# Patient Record
Sex: MALE | Race: BLACK OR AFRICAN AMERICAN | Employment: OTHER | URBAN - METROPOLITAN AREA
[De-identification: names, ages, dates, MRNs, and addresses within clinical notes are randomized per-mention and may not be internally consistent; named-entity substitution may affect disease eponyms.]

---

## 2020-07-07 ENCOUNTER — APPOINTMENT (OUTPATIENT)
Dept: GENERAL RADIOLOGY | Age: 45
End: 2020-07-07
Payer: COMMERCIAL

## 2020-07-07 ENCOUNTER — HOSPITAL ENCOUNTER (EMERGENCY)
Age: 45
Discharge: HOME | End: 2020-07-07
Payer: COMMERCIAL

## 2020-07-07 VITALS
SYSTOLIC BLOOD PRESSURE: 132 MMHG | RESPIRATION RATE: 16 BRPM | DIASTOLIC BLOOD PRESSURE: 88 MMHG | TEMPERATURE: 98.1 F | WEIGHT: 210 LBS | HEIGHT: 69 IN | OXYGEN SATURATION: 97 % | HEART RATE: 74 BPM | BODY MASS INDEX: 31.1 KG/M2

## 2020-07-07 PROCEDURE — 73090 X-RAY EXAM OF FOREARM: CPT

## 2020-07-07 PROCEDURE — 73130 X-RAY EXAM OF HAND: CPT

## 2020-07-07 PROCEDURE — 6370000000 HC RX 637 (ALT 250 FOR IP): Performed by: PHYSICIAN ASSISTANT

## 2020-07-07 PROCEDURE — 73630 X-RAY EXAM OF FOOT: CPT

## 2020-07-07 PROCEDURE — 99283 EMERGENCY DEPT VISIT LOW MDM: CPT

## 2020-07-07 PROCEDURE — 73610 X-RAY EXAM OF ANKLE: CPT

## 2020-07-07 RX ORDER — IBUPROFEN 600 MG/1
600 TABLET ORAL ONCE
Status: COMPLETED | OUTPATIENT
Start: 2020-07-07 | End: 2020-07-07

## 2020-07-07 RX ORDER — TRAMADOL HYDROCHLORIDE 50 MG/1
50 TABLET ORAL ONCE
Status: COMPLETED | OUTPATIENT
Start: 2020-07-07 | End: 2020-07-07

## 2020-07-07 RX ORDER — TRAMADOL HYDROCHLORIDE 50 MG/1
50 TABLET ORAL EVERY 6 HOURS PRN
Qty: 10 TABLET | Refills: 0 | Status: SHIPPED | OUTPATIENT
Start: 2020-07-07 | End: 2020-07-10

## 2020-07-07 RX ADMIN — IBUPROFEN 600 MG: 600 TABLET, FILM COATED ORAL at 20:43

## 2020-07-07 RX ADMIN — TRAMADOL HYDROCHLORIDE 50 MG: 50 TABLET, FILM COATED ORAL at 20:43

## 2020-07-07 ASSESSMENT — PAIN SCALES - GENERAL
PAINLEVEL_OUTOF10: 9
PAINLEVEL_OUTOF10: 9

## 2020-07-07 ASSESSMENT — PAIN DESCRIPTION - PAIN TYPE: TYPE: ACUTE PAIN
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
## 2020-07-08 NOTE — ED NOTES
Ace wrap applied to the right foot and ankle. Patient encouraged to apply ice at home. And RICE.  Patient left in stable condition     Chelsea Pollack RN  07/07/20 2042

## 2020-07-13 ENCOUNTER — HOSPITAL ENCOUNTER (INPATIENT)
Age: 45
LOS: 1 days | Discharge: LEFT BEFORE DISCHARGE | DRG: 469 | End: 2020-07-14
Attending: INTERNAL MEDICINE | Admitting: INTERNAL MEDICINE
Payer: COMMERCIAL

## 2020-07-13 ENCOUNTER — APPOINTMENT (OUTPATIENT)
Dept: CT IMAGING | Age: 45
DRG: 469 | End: 2020-07-13
Payer: COMMERCIAL

## 2020-07-13 PROBLEM — N17.9 ACUTE RENAL FAILURE (ARF) (HCC): Status: ACTIVE | Noted: 2020-07-13

## 2020-07-13 LAB
ALBUMIN SERPL-MCNC: 6.8 GM/DL (ref 3.4–5)
ALP BLD-CCNC: 77 IU/L (ref 40–128)
ALT SERPL-CCNC: 27 U/L (ref 10–40)
ANION GAP SERPL CALCULATED.3IONS-SCNC: 19 MMOL/L (ref 4–16)
AST SERPL-CCNC: 24 IU/L (ref 15–37)
BASOPHILS ABSOLUTE: 0.1 K/CU MM
BASOPHILS RELATIVE PERCENT: 0.4 % (ref 0–1)
BILIRUB SERPL-MCNC: 0.7 MG/DL (ref 0–1)
BUN BLDV-MCNC: 30 MG/DL (ref 6–23)
CALCIUM SERPL-MCNC: 12.6 MG/DL (ref 8.3–10.6)
CHLORIDE BLD-SCNC: 89 MMOL/L (ref 99–110)
CO2: 29 MMOL/L (ref 21–32)
CREAT SERPL-MCNC: 4.9 MG/DL (ref 0.9–1.3)
DIFFERENTIAL TYPE: ABNORMAL
EOSINOPHILS ABSOLUTE: 0 K/CU MM
EOSINOPHILS RELATIVE PERCENT: 0.3 % (ref 0–3)
GFR AFRICAN AMERICAN: 15 ML/MIN/1.73M2
GFR NON-AFRICAN AMERICAN: 13 ML/MIN/1.73M2
GLUCOSE BLD-MCNC: 162 MG/DL (ref 70–99)
HCT VFR BLD CALC: 52.1 % (ref 42–52)
HEMOGLOBIN: 18.1 GM/DL (ref 13.5–18)
IMMATURE NEUTROPHIL %: 0.5 % (ref 0–0.43)
LACTATE: 1.9 MMOL/L (ref 0.4–2)
LIPASE: 72 IU/L (ref 13–60)
LYMPHOCYTES ABSOLUTE: 2.5 K/CU MM
LYMPHOCYTES RELATIVE PERCENT: 17.9 % (ref 24–44)
MAGNESIUM: 3.4 MG/DL (ref 1.8–2.4)
MCH RBC QN AUTO: 30.5 PG (ref 27–31)
MCHC RBC AUTO-ENTMCNC: 34.7 % (ref 32–36)
MCV RBC AUTO: 87.7 FL (ref 78–100)
MONOCYTES ABSOLUTE: 0.7 K/CU MM
MONOCYTES RELATIVE PERCENT: 5 % (ref 0–4)
NUCLEATED RBC %: 0 %
PDW BLD-RTO: 12.9 % (ref 11.7–14.9)
PLATELET # BLD: 301 K/CU MM (ref 140–440)
PMV BLD AUTO: 10.5 FL (ref 7.5–11.1)
POTASSIUM SERPL-SCNC: 4.7 MMOL/L (ref 3.5–5.1)
RBC # BLD: 5.94 M/CU MM (ref 4.6–6.2)
SEGMENTED NEUTROPHILS ABSOLUTE COUNT: 10.8 K/CU MM
SEGMENTED NEUTROPHILS RELATIVE PERCENT: 75.9 % (ref 36–66)
SODIUM BLD-SCNC: 137 MMOL/L (ref 135–145)
TOTAL IMMATURE NEUTOROPHIL: 0.07 K/CU MM
TOTAL NUCLEATED RBC: 0 K/CU MM
TOTAL PROTEIN: 10.8 GM/DL (ref 6.4–8.2)
TROPONIN T: <0.01 NG/ML
WBC # BLD: 14.2 K/CU MM (ref 4–10.5)

## 2020-07-13 PROCEDURE — 99285 EMERGENCY DEPT VISIT HI MDM: CPT

## 2020-07-13 PROCEDURE — 83970 ASSAY OF PARATHORMONE: CPT

## 2020-07-13 PROCEDURE — 83735 ASSAY OF MAGNESIUM: CPT

## 2020-07-13 PROCEDURE — 6360000002 HC RX W HCPCS: Performed by: PHYSICIAN ASSISTANT

## 2020-07-13 PROCEDURE — 80053 COMPREHEN METABOLIC PANEL: CPT

## 2020-07-13 PROCEDURE — 1200000000 HC SEMI PRIVATE

## 2020-07-13 PROCEDURE — 84484 ASSAY OF TROPONIN QUANT: CPT

## 2020-07-13 PROCEDURE — 83690 ASSAY OF LIPASE: CPT

## 2020-07-13 PROCEDURE — 6370000000 HC RX 637 (ALT 250 FOR IP): Performed by: PHYSICIAN ASSISTANT

## 2020-07-13 PROCEDURE — 96374 THER/PROPH/DIAG INJ IV PUSH: CPT

## 2020-07-13 PROCEDURE — 74176 CT ABD & PELVIS W/O CONTRAST: CPT

## 2020-07-13 PROCEDURE — 85025 COMPLETE CBC W/AUTO DIFF WBC: CPT

## 2020-07-13 PROCEDURE — 93005 ELECTROCARDIOGRAM TRACING: CPT | Performed by: PHYSICIAN ASSISTANT

## 2020-07-13 PROCEDURE — 2580000003 HC RX 258: Performed by: PHYSICIAN ASSISTANT

## 2020-07-13 PROCEDURE — 83605 ASSAY OF LACTIC ACID: CPT

## 2020-07-13 RX ORDER — ONDANSETRON 2 MG/ML
4 INJECTION INTRAMUSCULAR; INTRAVENOUS ONCE
Status: COMPLETED | OUTPATIENT
Start: 2020-07-13 | End: 2020-07-13

## 2020-07-13 RX ORDER — DICYCLOMINE HCL 20 MG
20 TABLET ORAL ONCE
Status: COMPLETED | OUTPATIENT
Start: 2020-07-13 | End: 2020-07-13

## 2020-07-13 RX ORDER — SODIUM CHLORIDE 9 MG/ML
INJECTION, SOLUTION INTRAVENOUS CONTINUOUS
Status: DISCONTINUED | OUTPATIENT
Start: 2020-07-13 | End: 2020-07-14

## 2020-07-13 RX ORDER — 0.9 % SODIUM CHLORIDE 0.9 %
1000 INTRAVENOUS SOLUTION INTRAVENOUS ONCE
Status: COMPLETED | OUTPATIENT
Start: 2020-07-13 | End: 2020-07-13

## 2020-07-13 RX ORDER — FUROSEMIDE 10 MG/ML
40 INJECTION INTRAMUSCULAR; INTRAVENOUS ONCE
Status: COMPLETED | OUTPATIENT
Start: 2020-07-13 | End: 2020-07-14

## 2020-07-13 RX ADMIN — SODIUM CHLORIDE 1000 ML: 9 INJECTION, SOLUTION INTRAVENOUS at 21:10

## 2020-07-13 RX ADMIN — DICYCLOMINE HYDROCHLORIDE 20 MG: 20 TABLET ORAL at 21:10

## 2020-07-13 RX ADMIN — ONDANSETRON 4 MG: 2 INJECTION INTRAMUSCULAR; INTRAVENOUS at 21:10

## 2020-07-14 ENCOUNTER — APPOINTMENT (OUTPATIENT)
Dept: ULTRASOUND IMAGING | Age: 45
DRG: 469 | End: 2020-07-14
Payer: COMMERCIAL

## 2020-07-14 VITALS
WEIGHT: 190.06 LBS | RESPIRATION RATE: 17 BRPM | HEIGHT: 69 IN | HEART RATE: 57 BPM | DIASTOLIC BLOOD PRESSURE: 84 MMHG | SYSTOLIC BLOOD PRESSURE: 130 MMHG | OXYGEN SATURATION: 96 % | BODY MASS INDEX: 28.15 KG/M2 | TEMPERATURE: 98.1 F

## 2020-07-14 LAB
ALBUMIN SERPL-MCNC: 5.3 GM/DL (ref 3.4–5)
ALP BLD-CCNC: 63 IU/L (ref 40–129)
ALT SERPL-CCNC: 20 U/L (ref 10–40)
ANION GAP SERPL CALCULATED.3IONS-SCNC: 17 MMOL/L (ref 4–16)
ANION GAP SERPL CALCULATED.3IONS-SCNC: 20 MMOL/L (ref 4–16)
AST SERPL-CCNC: 18 IU/L (ref 15–37)
BACTERIA: ABNORMAL /HPF
BILIRUB SERPL-MCNC: 0.5 MG/DL (ref 0–1)
BILIRUBIN URINE: NEGATIVE MG/DL
BLOOD, URINE: ABNORMAL
BUN BLDV-MCNC: 31 MG/DL (ref 6–23)
BUN BLDV-MCNC: 33 MG/DL (ref 6–23)
CALCIUM SERPL-MCNC: 10.8 MG/DL (ref 8.3–10.6)
CALCIUM SERPL-MCNC: 9.5 MG/DL (ref 8.3–10.6)
CHLORIDE BLD-SCNC: 94 MMOL/L (ref 99–110)
CHLORIDE BLD-SCNC: 96 MMOL/L (ref 99–110)
CHLORIDE URINE RANDOM: 10 MMOL/L (ref 43–210)
CLARITY: ABNORMAL
CO2: 22 MMOL/L (ref 21–32)
CO2: 24 MMOL/L (ref 21–32)
COLOR: YELLOW
CREAT SERPL-MCNC: 3.6 MG/DL (ref 0.9–1.3)
CREAT SERPL-MCNC: 4.9 MG/DL (ref 0.9–1.3)
CREATININE URINE: 608.3 MG/DL (ref 39–259)
GFR AFRICAN AMERICAN: 15 ML/MIN/1.73M2
GFR AFRICAN AMERICAN: 22 ML/MIN/1.73M2
GFR NON-AFRICAN AMERICAN: 13 ML/MIN/1.73M2
GFR NON-AFRICAN AMERICAN: 18 ML/MIN/1.73M2
GLUCOSE BLD-MCNC: 138 MG/DL (ref 70–99)
GLUCOSE BLD-MCNC: 142 MG/DL (ref 70–99)
GLUCOSE, URINE: NEGATIVE MG/DL
HCT VFR BLD CALC: 53.9 % (ref 42–52)
HEMOGLOBIN: 17.1 GM/DL (ref 13.5–18)
HYALINE CASTS: 10 /LPF
KETONES, URINE: NEGATIVE MG/DL
LEUKOCYTE ESTERASE, URINE: NEGATIVE
LIPASE: 35 IU/L (ref 13–60)
MCH RBC QN AUTO: 30.4 PG (ref 27–31)
MCHC RBC AUTO-ENTMCNC: 31.7 % (ref 32–36)
MCV RBC AUTO: 95.9 FL (ref 78–100)
MUCUS: ABNORMAL HPF
NITRITE URINE, QUANTITATIVE: NEGATIVE
OSMOLALITY URINE: 638 MOS/L (ref 292–1090)
PDW BLD-RTO: 13 % (ref 11.7–14.9)
PH, URINE: 5 (ref 5–8)
PLATELET # BLD: 224 K/CU MM (ref 140–440)
PMV BLD AUTO: 10.6 FL (ref 7.5–11.1)
POTASSIUM SERPL-SCNC: 3.7 MMOL/L (ref 3.5–5.1)
POTASSIUM SERPL-SCNC: 4.9 MMOL/L (ref 3.5–5.1)
POTASSIUM, UR: 51.2 MMOL/L (ref 22–119)
PROT/CREAT RATIO, UR: 0.1
PROTEIN UA: 30 MG/DL
RBC # BLD: 5.62 M/CU MM (ref 4.6–6.2)
RBC URINE: 4 /HPF (ref 0–3)
SODIUM BLD-SCNC: 136 MMOL/L (ref 135–145)
SODIUM BLD-SCNC: 137 MMOL/L (ref 135–145)
SODIUM URINE: 29 MMOL/L (ref 35–167)
SPECIFIC GRAVITY UA: 1.02 (ref 1–1.03)
SQUAMOUS EPITHELIAL: <1 /HPF
TOTAL CK: 203 IU/L (ref 38–174)
TOTAL PROTEIN: 9.1 GM/DL (ref 6.4–8.2)
TRICHOMONAS: ABNORMAL /HPF
URINE TOTAL PROTEIN: 56.6 MG/DL
UROBILINOGEN, URINE: NORMAL MG/DL (ref 0.2–1)
VITAMIN D 25-HYDROXY: 15.58 NG/ML
WBC # BLD: 10.2 K/CU MM (ref 4–10.5)
WBC UA: 1 /HPF (ref 0–2)

## 2020-07-14 PROCEDURE — 94761 N-INVAS EAR/PLS OXIMETRY MLT: CPT

## 2020-07-14 PROCEDURE — 82330 ASSAY OF CALCIUM: CPT

## 2020-07-14 PROCEDURE — 82436 ASSAY OF URINE CHLORIDE: CPT

## 2020-07-14 PROCEDURE — 6360000002 HC RX W HCPCS: Performed by: PHYSICIAN ASSISTANT

## 2020-07-14 PROCEDURE — 83690 ASSAY OF LIPASE: CPT

## 2020-07-14 PROCEDURE — 83970 ASSAY OF PARATHORMONE: CPT

## 2020-07-14 PROCEDURE — 80048 BASIC METABOLIC PNL TOTAL CA: CPT

## 2020-07-14 PROCEDURE — 96375 TX/PRO/DX INJ NEW DRUG ADDON: CPT

## 2020-07-14 PROCEDURE — 81001 URINALYSIS AUTO W/SCOPE: CPT

## 2020-07-14 PROCEDURE — 80053 COMPREHEN METABOLIC PANEL: CPT

## 2020-07-14 PROCEDURE — 85027 COMPLETE CBC AUTOMATED: CPT

## 2020-07-14 PROCEDURE — 82306 VITAMIN D 25 HYDROXY: CPT

## 2020-07-14 PROCEDURE — 84156 ASSAY OF PROTEIN URINE: CPT

## 2020-07-14 PROCEDURE — 2580000003 HC RX 258: Performed by: INTERNAL MEDICINE

## 2020-07-14 PROCEDURE — 82570 ASSAY OF URINE CREATININE: CPT

## 2020-07-14 PROCEDURE — 84133 ASSAY OF URINE POTASSIUM: CPT

## 2020-07-14 PROCEDURE — 83935 ASSAY OF URINE OSMOLALITY: CPT

## 2020-07-14 PROCEDURE — 82550 ASSAY OF CK (CPK): CPT

## 2020-07-14 PROCEDURE — 93010 ELECTROCARDIOGRAM REPORT: CPT | Performed by: INTERNAL MEDICINE

## 2020-07-14 PROCEDURE — 84300 ASSAY OF URINE SODIUM: CPT

## 2020-07-14 PROCEDURE — 76775 US EXAM ABDO BACK WALL LIM: CPT

## 2020-07-14 RX ORDER — SODIUM CHLORIDE 9 MG/ML
INJECTION, SOLUTION INTRAVENOUS CONTINUOUS
Status: DISCONTINUED | OUTPATIENT
Start: 2020-07-14 | End: 2020-07-14 | Stop reason: HOSPADM

## 2020-07-14 RX ORDER — AMLODIPINE BESYLATE 5 MG/1
5 TABLET ORAL DAILY
COMMUNITY

## 2020-07-14 RX ORDER — ACETAMINOPHEN 325 MG/1
650 TABLET ORAL EVERY 6 HOURS PRN
Status: DISCONTINUED | OUTPATIENT
Start: 2020-07-14 | End: 2020-07-14 | Stop reason: HOSPADM

## 2020-07-14 RX ORDER — SODIUM CHLORIDE 0.9 % (FLUSH) 0.9 %
10 SYRINGE (ML) INJECTION EVERY 12 HOURS SCHEDULED
Status: DISCONTINUED | OUTPATIENT
Start: 2020-07-14 | End: 2020-07-14 | Stop reason: HOSPADM

## 2020-07-14 RX ORDER — ONDANSETRON 2 MG/ML
4 INJECTION INTRAMUSCULAR; INTRAVENOUS EVERY 6 HOURS PRN
Status: DISCONTINUED | OUTPATIENT
Start: 2020-07-14 | End: 2020-07-14 | Stop reason: SDUPTHER

## 2020-07-14 RX ORDER — ONDANSETRON 2 MG/ML
4 INJECTION INTRAMUSCULAR; INTRAVENOUS EVERY 6 HOURS PRN
Status: DISCONTINUED | OUTPATIENT
Start: 2020-07-14 | End: 2020-07-14 | Stop reason: HOSPADM

## 2020-07-14 RX ORDER — HEPARIN SODIUM 5000 [USP'U]/ML
5000 INJECTION, SOLUTION INTRAVENOUS; SUBCUTANEOUS EVERY 8 HOURS SCHEDULED
Status: DISCONTINUED | OUTPATIENT
Start: 2020-07-14 | End: 2020-07-14 | Stop reason: HOSPADM

## 2020-07-14 RX ORDER — PROMETHAZINE HYDROCHLORIDE 25 MG/1
12.5 TABLET ORAL EVERY 6 HOURS PRN
Status: DISCONTINUED | OUTPATIENT
Start: 2020-07-14 | End: 2020-07-14 | Stop reason: HOSPADM

## 2020-07-14 RX ORDER — PROMETHAZINE HYDROCHLORIDE 25 MG/1
12.5 TABLET ORAL EVERY 6 HOURS PRN
Status: DISCONTINUED | OUTPATIENT
Start: 2020-07-14 | End: 2020-07-14 | Stop reason: SDUPTHER

## 2020-07-14 RX ORDER — SODIUM CHLORIDE 0.9 % (FLUSH) 0.9 %
10 SYRINGE (ML) INJECTION PRN
Status: DISCONTINUED | OUTPATIENT
Start: 2020-07-14 | End: 2020-07-14 | Stop reason: HOSPADM

## 2020-07-14 RX ORDER — POLYETHYLENE GLYCOL 3350 17 G/17G
17 POWDER, FOR SOLUTION ORAL DAILY PRN
Status: DISCONTINUED | OUTPATIENT
Start: 2020-07-14 | End: 2020-07-14 | Stop reason: HOSPADM

## 2020-07-14 RX ORDER — ACETAMINOPHEN 650 MG/1
650 SUPPOSITORY RECTAL EVERY 6 HOURS PRN
Status: DISCONTINUED | OUTPATIENT
Start: 2020-07-14 | End: 2020-07-14 | Stop reason: HOSPADM

## 2020-07-14 RX ORDER — 0.9 % SODIUM CHLORIDE 0.9 %
1000 INTRAVENOUS SOLUTION INTRAVENOUS ONCE
Status: DISCONTINUED | OUTPATIENT
Start: 2020-07-14 | End: 2020-07-14 | Stop reason: HOSPADM

## 2020-07-14 RX ADMIN — SODIUM CHLORIDE: 9 INJECTION, SOLUTION INTRAVENOUS at 09:05

## 2020-07-14 RX ADMIN — SODIUM CHLORIDE: 9 INJECTION, SOLUTION INTRAVENOUS at 14:11

## 2020-07-14 RX ADMIN — FUROSEMIDE 40 MG: 10 INJECTION, SOLUTION INTRAVENOUS at 00:06

## 2020-07-14 ASSESSMENT — PAIN SCALES - GENERAL
PAINLEVEL_OUTOF10: 0

## 2020-07-14 NOTE — PROGRESS NOTES
Pt states that it is okay to talk to his significant other [place]. She has been provided with the pt code 6528.

## 2020-07-14 NOTE — PROGRESS NOTES
Hospitalist Progress Note      Name:  Renee Valdez /Age/Sex: 1975  (50 y.o. male)   MRN & CSN:  [id number] & [id number] Admission Date/Time: 2020  8:43 PM   Location:  [address] [place] [address] PCP: No primary care provider on file. Hospital Day: 2    ASSESSMENT & PLAN:   Renee Valdez is a 50 y.o.  male who presented with a complaint of nausea and vomiting of 1 day duration while at walk and exerting himself. He denies diarrhea. He denies NSAID use. He was found to have a creatinine of 4.9. Creatinine 2001 was 1.1. He is admitted to the hospital for NEGIN. Disposition: Home when medically stable. Remain in the hospital due to elevated creatinine    Acute kidney injury---baseline creatinine 1.1 in 2019. Creatinine admission 4.9. He does not report urinary symptoms. Renal ultrasound showed anatomically normal kidneys. CT abdomen pelvis nonacute. The patient is insisting on going home today. I advised him to stay in the hospital because his kidney function is not doing very well. There is a good chance that the patient may decide to leave the hospital AMA. - cc/h followed by 100 cc maintenance  -Avoid NSAIDs/contrast  -CMP daily  -Mag daily  -Urinalysis  -Urine protein creatinine ratio  -Urine drug screen    Hypercalcemia----calcium 12.6 on admission. Calcium 10.8 this morning. No known history of malignancy. It could be related to the acute kidney injury. No bone pain. It is less likely that he has multiple myeloma. -CMP daily  -IVF per above    Nausea and vomiting----with associated diarrhea. Had been using marijuana all his adult life. He smokes on a daily basis. The nausea and vomiting might be marijuana related. -IVF  -Advance diet as tolerated  -Antiemetics PRN    Marijuana abuse-- on a daily basis.     MEDICAL DECISION MAKING:  -Labs reviewed  -Imaging reviewed  -Level of risk moderate  Diet DIET RENAL;   DVT Prophylaxis [] Lovenox, [x]  Heparin, [] SCDs, [] Ambulation   GI Prophylaxis [] PPI,  [] H2 Blocker,  [] Carafate,  [] Diet/Tube Feeds   Code Status Full Code   Disposition  Home   MDM [] Low, [x] Moderate,[]  High     Chief complaint/Interval History/ROS     Chief Complaint: Nausea, vomiting, acute kidney injury      INTERVAL HISTORY: Creatinine remains elevated. He is requesting diet today. ROS:  No chest pain. Nausea and vomiting improving. No diarrhea. No fevers or chills. Objective: Intake/Output Summary (Last 24 hours) at 7/14/2020 1030  Last data filed at 7/14/2020 0050  Gross per 24 hour   Intake --   Output 350 ml   Net -350 ml      Vitals:   Vitals:    07/14/20 0827   BP:    Pulse: 65   Resp:    Temp:    SpO2:      Physical Exam:   GEN: Awake male, nontoxic appearing. Answers questions appropriately. EYES: Ocular muscles intact. HENT: Normocephalic atraumatic. No nasal discharge. NECK: Supple,  RESP: Clear to auscultation, no wheezes, rales or rhonchi. Symmetric chest movement. CV: Regular rate without appreciable murmurs. . No peripheral edema. GI: Abdomen is soft, non-tender,  Rectal exam deferred. :   Reyes catheter is not present. MSK: No gross joint deformities. SKIN: warm, dry, no rashes,  NEURO: Cranial nerves appear grossly intact, normal speech, no lateralizing weakness. PSYCH: Awake, alert, oriented x 4. Affect appropriate.     Medications:   Medications:    sodium chloride flush  10 mL Intravenous 2 times per day    sodium chloride  1,000 mL Intravenous Once    heparin (porcine)  5,000 Units Subcutaneous 3 times per day      Infusions:    sodium chloride 200 mL/hr at 07/14/20 0905     PRN Meds: sodium chloride flush, 10 mL, PRN  acetaminophen, 650 mg, Q6H PRN    Or  acetaminophen, 650 mg, Q6H PRN  polyethylene glycol, 17 g, Daily PRN  promethazine, 12.5 mg, Q6H PRN    Or  ondansetron, 4 mg, Q6H PRN          Electronically signed by Denise Britt MD on 7/14/2020 at 10:30 AM

## 2020-07-14 NOTE — CONSULTS
Nephrology Service Consultation        [address], [address] [address]  Phone: [phone]  Office Hours: 8:30AM - 4:30PM  Monday - Friday           Patient:  Jake Wang  MRN: [id number]  Consulting physician:  Don Fofana MD  Reason for Consult: elevated creatinine      PCP: No primary care provider on file. HISTORY OF PRESENT ILLNESS:   The patient is a 50 y.o. male with htn for which he takes amlodipine presented due to feeling dehydrated. He reports working in the heat yesterday and not making much urine despite appropriate hydration. He reports that the day before yesterday, he was doing just fine. He denies nsaid use, denies illicit drug use. Renal consult for cr 4.9 on admission, total calcium was also 12.8 . Serum creatinine was 1.1 on 1/11/19 per care everywhere. He is on NS currently. REVIEW OF SYSTEMS:  14 point ROS is Negative. See positive ROS per HPI    Past Medical History:        Diagnosis Date    Hypertension        Past Surgical History:        Procedure Laterality Date    ABDOMEN SURGERY      KNEE SURGERY Bilateral        Medications:   Prior to Admission medications    Medication Sig Start Date End Date Taking? Authorizing Provider   amLODIPine (NORVASC) 5 MG tablet Take 5 mg by mouth daily   Yes Historical Provider, MD        Allergies:  Patient has no known allergies. Social History:   TOBACCO:   reports that he has never smoked. He has never used smokeless tobacco.  ETOH:   reports current alcohol use. OCCUPATION:      Family History:   History reviewed. No pertinent family history.         Physical Exam:    Vitals: BP (!) 139/90   Pulse 65   Temp 98.3 °F (36.8 °C) (Oral)   Resp 13   Ht 5' 9\" (1.753 m)   Wt 190 lb 1 oz (86.2 kg)   SpO2 96%   BMI 28.07 kg/m²   General appearance: in no acute distress, appears stated age  Skin: Skin color, texture, turgor normal. No rashes or lesions  HEENT: normocephalic, atraumatic  Neck: supple, [phone]  FAX: [phone]

## 2020-07-14 NOTE — ED NOTES
Bed: ED-14  Expected date:   Expected time:   Means of arrival:   Comments:  [address] Grullon RN  07/13/20 1464
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
## 2020-07-14 NOTE — PROGRESS NOTES
Comprehensive Nutrition Assessment    Type and Reason for Visit:  Initial, Positive Nutrition Screen(wt loss, poor intake)    Nutrition Recommendations/Plan:   Continue Renal Diet  Begin renal oral nutrition supplement bid, between meals as needed/desired to optimize intake dos    Nutrition Assessment:  Admit with 1 day h/o N/V, NEGIN. Wt history N/A. No physical signs of malnutrition noted during limited observation. Pt was sleeping during room visit. Malnutrition Assessment:  Malnutrition Status:  No malnutrition    Context:  Acute Illness       Estimated Daily Nutrient Needs:  Energy (kcal):  7980-8318; Weight Used for Energy Requirements:  Current     Protein (g):  73-87 (1-1.2 g/kg IBW); Weight Used for Protein Requirements:  Ideal        Fluid (ml/day):  7067-1964 (1 ml/kcal); Weight Used for Fluid Requirements:  Current      Wounds:  None       Current Nutrition Therapies:    DIET RENAL; Anthropometric Measures:  · Height: 5' 9\" (175.3 cm)  · Current Body Weight: 190 lb 1 oz (86.2 kg)   · Admission Body Weight: 187 lb 11.2 oz (85.1 kg)    · Usual Body Weight: (n/a)     · Ideal Body Weight: 160 lbs; 118.8 lbs   · BMI: 28.1  · BMI Categories: Overweight (BMI 25.0-29. 9)       Nutrition Diagnosis:   · Predicted inadequate energy intake related to altered GI function as evidenced by nausea, vomiting, poor intake prior to admission    Nutrition Interventions:   Food and/or Nutrient Delivery:  Continue Current Diet, Start Oral Nutrition Supplement  Nutrition Education/Counseling:  Education not indicated   Coordination of Nutrition Care:  Continued Inpatient Monitoring    Goals:  Pt will consume greater than 50% of meals and supplements       Nutrition Monitoring and Evaluation:   Food/Nutrient Intake Outcomes:  Food and Nutrient Intake, Supplement Intake  Physical Signs/Symptoms Outcomes:  Biochemical Data, Nausea or Vomiting, Weight     Discharge Planning:    No discharge needs at this time Electronically signed by Dianna Taylor RD, LD on 7/14/20 at 2:44 PM EDT    Contact: [phone]

## 2020-07-14 NOTE — PROGRESS NOTES
Pt refused to change into a hospital gown for skin assessment. This nurse educated pt that two nurses need to verify that that pt has no wounds or open areas on the skin. Pt stated \" No I'm good\" from visible skin on shoulders and back skin is intact. Pt states that he does not have any wounds.
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
## 2020-07-14 NOTE — ADT AUTH CERT
Utilization Reviews         Renal Failure, Acute - Care Day 2 (7/14/2020) by Tavo Tamez RN         Review Status  Review Entered    Completed  7/14/2020 15:26        Criteria Review       Care Day: 2 Care Date: 7/14/2020 Level of Care: Telemetry    Guideline Day 2    Clinical Status    (X) * Electrolyte abnormalities absent or improved    (X) * Acid-base abnormalities absent or improved    (X) * Hypotension absent    Routes    (X) Parenteral or oral hydration    (X) Parenteral or oral medications    (X) Renal diet as tolerated    Interventions    (X) Monitor electrolytes, renal function tests, acid-base, and volume status    Medications    (X) Possible medical therapies    * Milestone    Additional Notes    7/14  day 2       ---------------------------                 07/14/20                        1340           ---------------------------     BP:          130/84           Pulse:         57             Resp:          17             Temp:   98.1 °F (36.7 °C)     SpO2:                        ---------------------------       MEDS-    Scheduled Meds:sodium chloride flush, 10 mL, Intravenous, 2 times per day    sodium chloride, 1,000 mL, Intravenous, Once    heparin (porcine), 5,000 Units, Subcutaneous, 3 times per day       Continuous Infusions:sodium chloride Last Rate: 200 mL/hr       Internal MD note-    Acute kidney injury---baseline creatinine 1.1 in January 2019.  Creatinine admission 4.9. [facility] does not report urinary symptoms.  Renal ultrasound showed anatomically normal kidneys.  CT abdomen pelvis nonacute.  The patient is insisting on going home today.  I advised him to stay in the hospital because his kidney function is not doing very well. [facility] is a good chance that the patient may decide to leave the hospital AMA.     - cc/h followed by 100 cc maintenance    -Avoid NSAIDs/contrast    -CMP daily    -Mag daily    -Urinalysis    -Urine protein creatinine ratio -Urine drug screen         Hypercalcemia----calcium 12.6 on admission.  Calcium 10.8 this morning.  No known history of malignancy.  It could be related to the acute kidney injury.  No bone pain.  It is less likely that he has multiple myeloma. -CMP daily    -IVF per above         Nausea and vomiting----with associated diarrhea.  Had been using marijuana all his adult life. Hieu Martin smokes on a daily basis. The nausea and vomiting might be marijuana related. -IVF    -Advance diet as tolerated    -Antiemetics PRN         Marijuana abuse-- on a daily basis. LABS-    Chloride 96    Bun 33    Cr 3.6    Anion gap 17       Urinalysis [[id number]] (Abnormal)    Collected: 07/14/20 1327    Updated: 07/14/20 1423    Specimen Source: Urine, clean catch    Color, UA YELLOW    Clarity, UA SLIGHTLY CLOUDYAbnormal      Glucose, Urine NEGATIVE MG/DL    Bilirubin Urine NEGATIVE MG/DL    Ketones, Urine NEGATIVE MG/DL    Specific Gravity, UA 1.024    Blood, Urine SMALLAbnormal      pH, Urine 5.0    Protein, UA 30Abnormal   MG/DL    Urobilinogen, Urine NORMAL MG/DL    Nitrite Urine, Quantitative NEGATIVE    Leukocyte Esterase, Urine NEGATIVE    RBC, UA 4High   /HPF    WBC, UA 1 /HPF    Bacteria, UA RAREAbnormal   /HPF    Squam Epithel, UA <1 /HPF    Mucus, UA MODERATEAbnormal   HPF    Trichomonas, UA NONE SEEN /HPF    Hyaline Casts, UA 10       Nephro note-    Renal consult for cr 4.9 on admission, total calcium was also 12.8 . Serum creatinine was 1.1 on 1/11/19 per care everywhere. Hieu Martin is on NS currently.     Assessment and Recommendations         Patient Active Problem List      Diagnosis Date Noted    · Acute renal failure (ARF) ([facility] [address] 07/13/2020       NEGIN; cr 4.9 on admission, baseline 1.1//ddx prerenal from volume depletion vs ATN    Hypercalcemia    HTN hx    Volume depletion         Continue IVF at current rate    Will add a spep to his blood to evaluate the hypercalcemia    Renal US shows no HN    Avoid nephrotoxins Renally dose meds           PA recommendation IP by Debbie Olsen RN         Review Status  Review Entered    In Primary  2020 15:25        Criteria Review    slr keep in  We recommend that the following pt's current hospitalization under INPATIENT   status is APPROPRIATE . If you agree, please place a new ADMIT order in [facility] as recommended.     Name: Mariann Irwin   : 1975   CSN: [id number]   [facility]        Clinical summary Emesis x 6   Vitals stable  Labs and Imaging Cr. 4.9, WBC 14.2-10.2, no urine output, 350 cc emesis  documented   MCG criteria applies yes, [id number]  Comments Anuria as indicated by ALL of the following:  ·Adequate volume status  ·Oligoanuria as indicated by 1 or more of the following:  Urine output less than 0.3 mL/kg/hour for 24 hours  Anuria (urine output less than 0.1 mL/kg/hour) for 12 hours

## 2020-07-14 NOTE — PLAN OF CARE
Problem: Fluid Volume:  Goal: Signs and symptoms of dehydration will decrease  Description: Signs and symptoms of dehydration will decrease  Outcome: Completed  Goal: Ability to achieve a balanced intake and output will improve  Description: Ability to achieve a balanced intake and output will improve  Outcome: Completed  Goal: Diagnostic test results will improve  Description: Diagnostic test results will improve  Outcome: Completed     Problem: Physical Regulation:  Goal: Complications related to the disease process, condition or treatment will be avoided or minimized  Description: Complications related to the disease process, condition or treatment will be avoided or minimized  Outcome: Completed  Goal: Ability to maintain vital signs within normal range will improve  Description: Ability to maintain vital signs within normal range will improve  Outcome: Completed

## 2020-07-14 NOTE — ED PROVIDER NOTES
EKG Interpretation    Interpreted by emergency department physician    Rhythm: normal sinus  and 1 degree AV block  Rate: normal  Axis: normal  Ectopy: none  Conduction: normal and 1st degree AV block  ST Segments: no acute change  T Waves: inversion in  I and II  Q Waves: nonspecific    Clinical Impression: non-specific EKG and 1st degree AV block    DO Pete Bonilla DO  07/13/20 4813
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
## 2020-07-15 NOTE — DISCHARGE SUMMARY
tolerated  Disposition: Discharged to:   []Home, []HHC, []SNF, []Acute Rehab, [x] home, left AMA  Condition on discharge: Stable    Discharge Medications:      STEPHAN Jamison [facility] Medication Instructions CHARLA:[id number]    Printed on:07/15/20 1539   Medication Information                      amLODIPine (NORVASC) 5 MG tablet  Take 5 mg by mouth daily                 Objective Findings at Discharge:   /84   Pulse 57   Temp 98.1 °F (36.7 °C) (Oral)   Resp 17   Ht 5' 9\" (1.753 m)   Wt 190 lb 1 oz (86.2 kg)   SpO2 96%   BMI 28.07 kg/m²            PHYSICAL EXAM   GEN: Awake male, nontoxic appearing. Answers questions appropriately. EYES: Ocular muscles intact. HENT: Normocephalic atraumatic. No nasal discharge. NECK: Supple,  RESP: Clear to auscultation, no wheezes, rales or rhonchi. Symmetric chest movement. CV: Regular rate without appreciable murmurs. . No peripheral edema. GI: Abdomen is soft, non-tender,  Rectal exam deferred. :   Ryees catheter is not present. MSK: No gross joint deformities. SKIN: warm, dry, no rashes,  NEURO: Cranial nerves appear grossly intact, normal speech, no lateralizing weakness. PSYCH: Awake, alert, oriented x 4. Affect appropriate.     BMP/CBC  Recent Labs     07/13/20 2059 07/14/20  0204 07/14/20  1207    136 137   K 4.7 4.9 3.7   CL 89* 94* 96*   CO2 29 22 24   BUN 30* 31* 33*   CREATININE 4.9* 4.9* 3.6*   WBC 14.2* 10.2  --    HCT 52.1* 53.9*  --     224  --        IMAGING:  All imaging reviewed before discharge    Discharge Time of 26 minutes    Electronically signed by Rory Silva MD on 7/15/2020 at 3:39 PM

## 2020-07-16 LAB
CALCIUM IONIZED: 4.36 MG/DL (ref 4.48–5.28)
CALCIUM SERPL-MCNC: 9.5 MG/DL (ref 8.3–10.6)
IONIZED CA: 1.09 MMOL/L (ref 1.12–1.32)
PARATHYROID HORMONE INTACT: 12 PG/ML (ref 15–65)
PARATHYROID HORMONE INTACT: 31 PG/ML (ref 15–65)

## 2020-07-21 LAB
EKG ATRIAL RATE: 68 BPM
EKG DIAGNOSIS: NORMAL
EKG P AXIS: 70 DEGREES
EKG P-R INTERVAL: 232 MS
EKG Q-T INTERVAL: 386 MS
EKG QRS DURATION: 96 MS
EKG QTC CALCULATION (BAZETT): 410 MS
EKG R AXIS: 26 DEGREES
EKG T AXIS: 49 DEGREES
EKG VENTRICULAR RATE: 68 BPM

## 2022-10-09 ENCOUNTER — HOSPITAL ENCOUNTER (EMERGENCY)
Age: 47
Discharge: HOME | End: 2022-10-09
Attending: EMERGENCY MEDICINE
Payer: COMMERCIAL

## 2022-10-09 VITALS
WEIGHT: 190 LBS | OXYGEN SATURATION: 99 % | HEIGHT: 69 IN | TEMPERATURE: 97.8 F | DIASTOLIC BLOOD PRESSURE: 111 MMHG | RESPIRATION RATE: 18 BRPM | HEART RATE: 68 BPM | BODY MASS INDEX: 28.14 KG/M2 | SYSTOLIC BLOOD PRESSURE: 178 MMHG

## 2022-10-09 DIAGNOSIS — L03.115 CELLULITIS OF RIGHT LOWER EXTREMITY: Primary | ICD-10-CM

## 2022-10-09 PROCEDURE — 99283 EMERGENCY DEPT VISIT LOW MDM: CPT

## 2022-10-09 RX ORDER — CEPHALEXIN 500 MG/1
500 CAPSULE ORAL 3 TIMES DAILY
Qty: 21 CAPSULE | Refills: 0 | Status: SHIPPED | OUTPATIENT
Start: 2022-10-09 | End: 2022-10-16

## 2022-10-09 RX ORDER — SULFAMETHOXAZOLE AND TRIMETHOPRIM 800; 160 MG/1; MG/1
1 TABLET ORAL 2 TIMES DAILY
Qty: 14 TABLET | Refills: 0 | Status: SHIPPED | OUTPATIENT
Start: 2022-10-09 | End: 2022-10-16

## 2022-10-09 ASSESSMENT — ENCOUNTER SYMPTOMS
CONSTIPATION: 0
SORE THROAT: 0
DIARRHEA: 0
VOMITING: 0
COLOR CHANGE: 1
SHORTNESS OF BREATH: 0
SINUS PRESSURE: 0
WHEEZING: 0
RHINORRHEA: 0
NAUSEA: 0
ABDOMINAL PAIN: 0
COUGH: 0

## 2022-10-09 ASSESSMENT — PAIN DESCRIPTION - ORIENTATION: ORIENTATION: RIGHT;LOWER

## 2022-10-09 ASSESSMENT — PAIN DESCRIPTION - LOCATION: LOCATION: LEG

## 2022-10-09 ASSESSMENT — PAIN - FUNCTIONAL ASSESSMENT
PAIN_FUNCTIONAL_ASSESSMENT: NONE - DENIES PAIN
PAIN_FUNCTIONAL_ASSESSMENT: 0-10

## 2022-10-09 ASSESSMENT — PAIN SCALES - GENERAL: PAINLEVEL_OUTOF10: 7

## 2022-10-09 ASSESSMENT — PAIN DESCRIPTION - DESCRIPTORS: DESCRIPTORS: SORE

## 2022-10-09 NOTE — ED PROVIDER NOTES
Emergency Department Encounter    Patient: Cior Lamas  MRN: [id number]  : 1975  Date of Evaluation: 10/9/2022  ED Provider:  Car Allen DO    Triage Chief Complaint:   Insect Bite (On right lower leg. Showed up about a week ago. Concern for spider bite. )    HPI:  Ciro Lamas is a 50 y.o. male past medical history significant for hypertension who presents with 2 areas of redness on his right lower extremity. Patient states this has been present for 1 week. He has tried using rubbing alcohol without improvement of symptoms. He also states he has tried to squeeze the areas of redness, and has only had clear discharge. He states he is concerned for a spider bite. He recently has been spending time with a new girlfriend at her home, and he states that she has similar wounds. He denies any trauma or outdoor exposures. Denies F/C, CP, SOB, palpitations, N/V, abdominal pain, dysuria, diarrhea and constipatin. ROS:  Review of Systems   Constitutional:  Negative for chills and fever. HENT:  Negative for congestion, rhinorrhea, sinus pressure and sore throat. Eyes:  Negative for visual disturbance. Respiratory:  Negative for cough, shortness of breath and wheezing. Cardiovascular:  Negative for chest pain and palpitations. Gastrointestinal:  Negative for abdominal pain, constipation, diarrhea, nausea and vomiting. Genitourinary:  Negative for dysuria, frequency and urgency. Musculoskeletal:  Negative for arthralgias and myalgias. Skin:  Positive for color change and wound. Negative for rash. Neurological:  Negative for dizziness and syncope. Psychiatric/Behavioral:  Negative for agitation, behavioral problems and confusion. Past Medical History:   Diagnosis Date    Hypertension      Past Surgical History:   Procedure Laterality Date    ABDOMEN SURGERY      KNEE SURGERY Bilateral      History reviewed. No pertinent family history.   Social History     Socioeconomic History    Marital status: Single     Spouse name: Not on file    Number of children: Not on file    Years of education: Not on file    Highest education level: Not on file   Occupational History    Not on file   Tobacco Use    Smoking status: Never    Smokeless tobacco: Never   Substance and Sexual Activity    Alcohol use: Yes     Comment: occ    Drug use: Yes     Types: Marijuana Daril Jb)    Sexual activity: Not on file   Other Topics Concern    Not on file   Social History Narrative    Not on file     Social Determinants of Health     Financial Resource Strain: Not on file   Food Insecurity: Not on file   Transportation Needs: Not on file   Physical Activity: Not on file   Stress: Not on file   Social Connections: Not on file   Intimate Partner Violence: Not on file   Housing Stability: Not on file     No current facility-administered medications for this encounter. Current Outpatient Medications   Medication Sig Dispense Refill    sulfamethoxazole-trimethoprim (BACTRIM DS) 800-160 MG per tablet Take 1 tablet by mouth 2 times daily for 7 days 14 tablet 0    cephALEXin (KEFLEX) 500 MG capsule Take 1 capsule by mouth 3 times daily for 7 days 21 capsule 0    amLODIPine (NORVASC) 5 MG tablet Take 5 mg by mouth daily       No Known Allergies    Nursing Notes Reviewed    Physical Exam:  Triage VS:    ED Triage Vitals   Enc Vitals Group      BP 10/09/22 0423 (!) 178/111      Heart Rate 10/09/22 0423 68      Resp 10/09/22 0423 18      Temp 10/09/22 0423 97.8 °F (36.6 °C)      Temp Source 10/09/22 0423 Oral      SpO2 10/09/22 0423 99 %      Weight 10/09/22 0424 190 lb (86.2 kg)      Height 10/09/22 0424 5' 9\" (1.753 m)      Head Circumference --       Peak Flow --       Pain Score --       Pain Loc --       Pain Edu? --       Excl. in [address]? --      Physical Exam  Vitals and nursing note reviewed. Constitutional:       General: He is not in acute distress. Appearance: Normal appearance.  He is not ill-appearing or toxic-appearing. HENT:      Head: Normocephalic and atraumatic. Nose: Nose normal.      Mouth/Throat:      Mouth: Mucous membranes are moist.   Eyes:      Conjunctiva/sclera: Conjunctivae normal.   Cardiovascular:      Rate and Rhythm: Normal rate and regular rhythm. Pulses: Normal pulses. Pulmonary:      Effort: Pulmonary effort is normal. No respiratory distress. Breath sounds: Normal breath sounds. No wheezing, rhonchi or rales. Abdominal:      General: Abdomen is flat. Bowel sounds are normal. There is no distension. Palpations: Abdomen is soft. Tenderness: There is no abdominal tenderness. There is no guarding or rebound. Musculoskeletal:         General: Normal range of motion. Skin:     General: Skin is warm. Capillary Refill: Capillary refill takes less than 2 seconds. Comments: 2 areas of approximately 1 cm circular ulceration to the posterior aspect of patient's right leg. One is about mid thigh posteriorly, and the other is mid calf posteriorly. There is minimal surrounding cellulitis of both these lesions. No purulent discharge appreciated. Minimally tender to palpation. No fluctuance or induration. Neurological:      Mental Status: He is alert and oriented to person, place, and time. Mental status is at baseline. Psychiatric:         Mood and Affect: Mood normal.            Chart review shows recent radiographs:  No results found. MDM:  Patient seen and examined. On exam lesions consistent with cellulitis. Low suspicion for sepsis, compartments were soft without crepitus. Patient is afebrile, not tachycardic, not tachypneic, 99% on room air, he is hypertensive, however he does have a history of this. Do think patient is appropriate for outpatient follow-up. Patient to follow-up with primary care provider in 1 to 2 days. He is given Keflex and Bactrim for cellulitis.   He is to return to the emergency department if symptoms worsen, return precautions are discussed and he does verbalize understanding of these. All questions are answered and he is agreeable with plan of care. Clinical Impression:  1. Cellulitis of right lower extremity      Disposition referral (if applicable):  MARYLOU Pratt  [facility] [address]  [phone]    Schedule an appointment as soon as possible for a visit in 2 days      [facility] Emergency Department  [place] [id number]  [phone]  Go to   As needed, If symptoms worsen    Disposition medications (if applicable):  New Prescriptions    CEPHALEXIN (KEFLEX) 500 MG CAPSULE    Take 1 capsule by mouth 3 times daily for 7 days    SULFAMETHOXAZOLE-TRIMETHOPRIM (BACTRIM DS) 800-160 MG PER TABLET    Take 1 tablet by mouth 2 times daily for 7 days       Comment: Please note this report has been produced using speech recognition software and may contain errors related to that system including errors in grammar, punctuation, and spelling, as well as words and phrases that may be inappropriate. Efforts were made to edit the dictations.        [address] [place] [address]  10/09/22 8749

## 2022-10-09 NOTE — ED NOTES
Patient states \" I have high blood pressure and have been out my medication for some time.  \"     Margarita Thorne, CHASIDY  10/09/22 3773

## 2023-01-09 ENCOUNTER — HOSPITAL ENCOUNTER (OUTPATIENT)
Dept: PSYCHIATRY | Age: 48
Setting detail: THERAPIES SERIES
Discharge: HOME | End: 2023-01-09
Payer: COMMERCIAL

## 2023-01-09 PROCEDURE — 80305 DRUG TEST PRSMV DIR OPT OBS: CPT

## 2023-01-09 PROCEDURE — 90791 PSYCH DIAGNOSTIC EVALUATION: CPT

## 2023-01-09 ASSESSMENT — ANXIETY QUESTIONNAIRES
2. NOT BEING ABLE TO STOP OR CONTROL WORRYING: 0
4. TROUBLE RELAXING: 0
5. BEING SO RESTLESS THAT IT IS HARD TO SIT STILL: 0
1. FEELING NERVOUS, ANXIOUS, OR ON EDGE: 0
7. FEELING AFRAID AS IF SOMETHING AWFUL MIGHT HAPPEN: 0
GAD7 TOTAL SCORE: 0
IF YOU CHECKED OFF ANY PROBLEMS ON THIS QUESTIONNAIRE, HOW DIFFICULT HAVE THESE PROBLEMS MADE IT FOR YOU TO DO YOUR WORK, TAKE CARE OF THINGS AT HOME, OR GET ALONG WITH OTHER PEOPLE: NOT DIFFICULT AT ALL
6. BECOMING EASILY ANNOYED OR IRRITABLE: 0
3. WORRYING TOO MUCH ABOUT DIFFERENT THINGS: 0

## 2023-01-09 NOTE — PROGRESS NOTES
[address] [place] [address] Urinalysis Laboratory Testing and Medical History Physician Order       Location: [x] [place] [] Jed Yousif MD., [id number], Medical Director of [facility] Director orders for [address] [place] [address] clinical therapists to collect an urine sample from the below patient,  and medical order for placement in level of care documented on  [facility] scanned order. Client: Tuan Owusu   : 1975   Case# [id number]    Urine sample will be collected following the collections guidelines provided on [facility]) custody form, and completion of the [facility] Non-Federal chain of custody drug screening form. During the course of treatment, randomly a urine sample will be collected, at a minimum of one time a month, more frequently as needed, as part of the clinical outpatient alcohol and drug treatment program at [address] [place] [address]. Medical care recommendation for clients experiencing/reporting  medical concerns, who do not have a family physician and willing to attend  medical care will be assisted in seeking medical care. Clinical providers will refer clients to local family physicians practices as part of the  clients treatment plan and assist the client in gaining access to an appointment. Release of information will be requested to support the  clients seeking medical care. Summary of Medical History  Prior to Admission medications    Medication Sig Start Date End Date Taking?  Authorizing Provider   amLODIPine (NORVASC) 5 MG tablet Take 5 mg by mouth daily    Historical Provider, MD     Past Surgical History:   Procedure Laterality Date    ABDOMEN SURGERY      KNEE SURGERY Bilateral      Past Medical History:   Diagnosis Date    Hypertension      Patient Active Problem List    Diagnosis Date Noted    [facility]       Jeremias Frank [facility]-  23:70 PM

## 2023-01-09 NOTE — PROGRESS NOTES
[facility]                     CLINICAL DIAGNOSIS SUMMARY    Location: [x] [place] [] [place]                   Patient Name: Gino Varghese   : 1975     Case # :  [id number]  Therapist: BARRY Zuñiga      Identifying information:  Gino Varghese / 1975             Gino Varghese is a 50year old  male: never : identify he estimate he has approximately 20 children: indicated involved with [address]: officer [facility]: secondary to obtaining SILAS in summer 2022: indicated Breathalyzer . 131: indicated license is suspended: on probation six months: completed Weekend Intervention Program: medically: prior ACL/ MCL: hypertension: prior charges: Receiving [email or web address], Trafficking, weapons under disability, Assault: Possession of Drugs, Kidnapping x 2, domestic violence x 2: prior history of parole and sentenced to [facility] 2 years; historically expressed he continued to violate probation until he was eventually was released. 2.  Substance use history:        F10.10 Alcohol Use Disorder  F12.10 Cannabis Use Disorder     Onset of smoking marijuana: last smoked 2022: indicated onset smoking age 23: estimate from age 23 until 20: primarily smoked daily: multiple joints daily: from age 30 until age 50: estimate smoking few times monthly: historically smoking more than intended, continued use despite interpersonal problems exacerbated by use, physically hazardous, tolerance. Onset of consuming alcohol age 19: last use summer 2022: identify history of consuming beer and liquor: denies any history of blackouts, hangovers: historically indicate the extent of consuming alcohol was 2 shots; denies intoxications: report bi monthly drinking.           3. Consequences of substance use: (personal, inter-personal, legal, occupational, medical, nutritional,       Leisure, spiritual, etc.) Medical: hypertension and knee surgery      Legal: indicated involved with [address]: officer [facility]: secondary to obtaining SILAS in summer 2022: indicated Breathalyzer . 253: indicated license is suspended: on probation six months: completed Weekend Intervention Program: medically: prior ACL/ MCL: hypertension: prior charges: Receiving [employer], Trafficking, weapons under disability, Assault: Possession of Drugs, Kidnapping x 2, domestic violence x 2: prior history of parole and sentenced to [facility] 2 years; historically expressed he continued to violate probation until he was eventually was released. 4. Co-existing problems;  (mental health, psychiatric, previous treatment programs, family       Problems, social, educational, etc.)            Previous treatment: completed weekend Intervention program 2022                5. Treatment needs, barriers to treatment, impact of disease on life:          License suspended           Summary of Medical History:  Prior to Admission medications    Medication Sig Start Date End Date Taking? Authorizing Provider   amLODIPine (NORVASC) 5 MG tablet Take 5 mg by mouth daily    Historical Provider, MD     Past Surgical History:   Procedure Laterality Date    ABDOMEN SURGERY      KNEE SURGERY Bilateral      Past Medical History:   Diagnosis Date    Hypertension      Patient Active Problem List    Diagnosis Date Noted    Acute renal failure (ARF) ([facility] [address] 07/13/2020       6. Level of Care Determination:          Level I            7. Treatment available      __X__yes   _____no           8.   Name of program referred:    __X__[facility],    ____[facility],       _______other/identify           Electronically signed by BARRY Garcia on 9/9/1325 at 3:53 PM

## 2023-01-09 NOTE — PROGRESS NOTES
[facility]        Individual  Progress Note    Location: [x] [place] [] [place]                   Patient Name: Merlene Swartz   : 1975     Case # :  [id number]  Therapist: BARRY Zaldivar      1 hour       S: Merlene Swartz is a 50year old  male: never : identify he estimate he has approximately 20 children: indicated involved with [address]: officer [facility]: secondary to obtaining SILAS in summer 2022: indicated Breathalyzer . 226: indicated license is suspended: on probation six months: completed Weekend Intervention Program: medically: prior ACL/ MCL: hypertension: prior charges: Receiving [employer], Trafficking, weapons under disability, Assault: Possession of Drugs, Kidnapping x 2, domestic violence x 2: prior history of parole and sentenced to alf 2 years; historically expressed he continued to violate probation until he was eventually was released. O: Denies any homicidal and suicidal ideation: denies any psychosis, oriented x 4           A: Collected urine drug screen, initiated psychosocial assessment, and other pertinent and vital documentation essential for client to engage services. Addictive Behavior, SSRS suicide screening, Addictive services, Spiritual screening, Health History, Provided client documentation for resources. Trauma history, Behavior Screening, Mental status, Alcohol screening and Drug screening. P: Plan to review urine drug screen, complete progress report, finalize remaining psychosocial assessment, complete treatment plan and establish adequate level of care and recommendations.              Electronically signed by BARRY Zaldivar on 1589 at 3:29 PM     Richard Feliz LSW, LICDC-CS

## 2023-01-16 ENCOUNTER — HOSPITAL ENCOUNTER (OUTPATIENT)
Dept: PSYCHIATRY | Age: 48
Setting detail: THERAPIES SERIES
Discharge: HOME | End: 2023-01-16
Payer: COMMERCIAL

## 2023-01-16 PROCEDURE — 90834 PSYTX W PT 45 MINUTES: CPT

## 2023-01-16 NOTE — PROGRESS NOTES
[facility]        Individual  Progress Note    Location: [x] [place] [] [place]                   Patient Name: Michell Knutson   : 1975     Case # : [id number]  Therapist: BARRY Machuca        1 hour        S: Sanazgiles Frey arrived, cooperative, expressed addresses other legal matters surrounding potential indictment for charges of trafficking and weapons: expressed desire to review current SILAS charge with  instead of embracing recommendations. O: Denies any homicidal and suicidal ideation: denies any psychosis, oriented x 4         A: Reviewed urine drug screen, completed remaining  psychosocial assessment, complete progress note and other pertinent and vital documentation essential for client to engage services. P: recommendations: consist of Individual sessions and outpatient group.             Electronically signed by BARRY Machuca on  at 11:16 AM       BRY Lewis, EMELIA, HUSAMCS

## 2023-01-23 ENCOUNTER — HOSPITAL ENCOUNTER (OUTPATIENT)
Dept: PSYCHIATRY | Age: 48
Setting detail: THERAPIES SERIES
Discharge: HOME | End: 2023-01-23
Payer: COMMERCIAL

## 2023-01-23 RX ORDER — LORAZEPAM 0.5 MG/1
0.5 TABLET ORAL EVERY 6 HOURS PRN
COMMUNITY

## 2023-01-23 RX ORDER — DULOXETIN HYDROCHLORIDE 20 MG/1
20 CAPSULE, DELAYED RELEASE ORAL DAILY
COMMUNITY

## 2023-01-23 RX ORDER — DIVALPROEX SODIUM 500 MG/1
500 TABLET, DELAYED RELEASE ORAL 2 TIMES DAILY
COMMUNITY

## 2023-01-23 RX ORDER — QUETIAPINE FUMARATE 50 MG/1
50 TABLET, EXTENDED RELEASE ORAL NIGHTLY
COMMUNITY

## 2023-01-23 NOTE — PROGRESS NOTES
[facility]        Individual  Progress Note    Location: [x] [place] [] [place]                   Patient Name: Ramy Duong   : 1975     Case # :  [id number]  Therapist: BARRY Ward        Did not  show           Electronically signed by BARRY Ward on 3/07/8648 at 11:20 AM         Richard Doll LSW, LICDC-CS

## 2023-01-30 ENCOUNTER — APPOINTMENT (OUTPATIENT)
Dept: PSYCHIATRY | Age: 48
End: 2023-01-30
Payer: COMMERCIAL